# Patient Record
Sex: FEMALE | Race: WHITE
[De-identification: names, ages, dates, MRNs, and addresses within clinical notes are randomized per-mention and may not be internally consistent; named-entity substitution may affect disease eponyms.]

---

## 2019-03-11 ENCOUNTER — HOSPITAL ENCOUNTER (EMERGENCY)
Dept: HOSPITAL 41 - JD.ED | Age: 59
Discharge: HOME | End: 2019-03-11
Payer: COMMERCIAL
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
## 2019-03-11 DIAGNOSIS — S06.0X1A: Primary | ICD-10-CM

## 2019-03-11 DIAGNOSIS — Z88.2: ICD-10-CM

## 2019-03-11 DIAGNOSIS — S13.9XXA: ICD-10-CM

## 2019-03-11 DIAGNOSIS — Z88.0: ICD-10-CM
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
## 2019-03-11 NOTE — CT
Head CT

 

Technique: Multiple axial sections through the brain were obtained.  

Intravenous contrast was not utilized.

 

Comparison: No prior intracranial imaging is available.

 

Findings: Ventricles along with basal cisterns and sulci over 

convexities are within normal limits for the patient's age.  No 

abnormal parenchymal densities are seen.  No evidence of intracranial 

hemorrhage.  No midline shift or mass effect is seen.

 

Bone window settings were reviewed which shows no acute calvarial 

abnormality.  Visualized sinuses are clear.  Incidental 

atherosclerotic calcification is noted within the carotid siphon.

 

Impression:

1.  Atherosclerotic calcification within the carotid siphon.

2.  No acute intracranial abnormality is identified.

 

Diagnostic code #2

## 2019-03-11 NOTE — CT
CT cervical spine

 

Technique: Multiple axial sections through the cervical spine were 

obtained from above C1 inferiorly to the bottom of T2.  Reconstructed 

sagittal and coronal images were reviewed.

 

Comparison: No prior cervical spine imaging.

 

Findings:  Severe disc space narrowing is noted C4-5, C5-6 and C6-7.  

Posterior osteophytes are seen at these 3 levels as well as anterior 

osteophytes.  Mild anterior disc space narrowing is noted C2-3 and 

C3-4.  Minimal ligamentum nuchal calcification is seen.  

 

Moderate neural foraminal narrowing is noted at C4-5 on the right 

side.  Mild left-sided neural foraminal stenosis is noted at C5-6.  

Moderate right-sided neural foraminal stenosis is noted at C6-7.  

Other neural foramina are patent.  No bony central canal stenosis is 

seen.

 

No fracture is seen.  No abnormal subluxation is noted.

 

Mild scattered degenerative change is seen within the apophyseal 

joints.  Degenerative change is noted within the uncovertebral joints 

at C4-5, C5-6 and C6-7.

 

Impression:

1.  Degenerative change as noted above.

2.  Nothing acute is seen on CT study of the cervical spine.

 

Diagnostic code #2

## 2020-10-05 NOTE — EDM.PDOC
ED HPI GENERAL MEDICAL PROBLEM





- General


Chief Complaint: Neuro Symptoms/Deficits


Stated Complaint: [facility]


Time Seen by Provider: 10/05/20 08:21


Source of Information: Reports: Patient, EMS


History Limitations: Reports: No Limitations





- History of Present Illness


INITIAL COMMENTS - FREE TEXT/NARRATIVE: 





The patient presents by [facility] ambulance for a seizure.  The patient 

was at home and she went down to the frig to get her significant other something

to eat.  She felt nauseated when she walk to the frig and then she woke up on 

the floor and her significant other was holding her head and calling 911.  She 

had a headache before this for a few days.  She also had been coughing for a few

days.  She has not been around anyone who is sick as far as she knows.  [place].  Her significant other had knee 

surgery in [place] last week and that was the only time she had been out except

to the [employer] and grocery stores.  She has never had a seizure before. 

She has no ches pain, shortness of breath, abdominal pain, nausea or vomiting.  

She has no diarrhea.


Onset: Sudden


Duration: Minutes:


Location: Reports: Generalized


Severity: Moderate


Improves with: Reports: None


Worsens with: Reports: None


Associated Symptoms: Reports: Cough, Headaches, Seizure.  Denies: Chest Pain, 

Fever/Chills, Nausea/Vomiting, Shortness of Breath


  ** Headache


Pain Score (Numeric/FACES): 4





- Related Data


                                    Allergies











Allergy/AdvReac Type Severity Reaction Status Date / Time


 


Penicillins Allergy  Swelling Verified 10/05/20 08:24


 


Sulfa (Sulfonamide Allergy  Swelling Verified 10/05/20 08:24





Antibiotics)     











Home Meds: 


                                    Home Meds





Alendronate Sodium 1 cap PO MO 19 [History]


Progesterone, Micronized [Progesterone] 1 cap PO BEDTIME 19 [History]


estradioL [Estradiol] 1 tab VAG MOTU 19 [History]


Ascorbic Acid [Vitamin C] 1,000 mg PO DAILY 10/05/20 [History]


Calc/D3/Mag/Zn/Inessa/Joseph/Boron [Calcium 600 MG Plus Vit D] 1 cap PO DAILY 

10/05/20 [History]


Levothyroxine 0 mcg PO DAILY 10/05/20 [History]











Past Medical History





- Past Health History


Medical/Surgical History: Denies Medical/Surgical History


HEENT History: Reports: Impaired Vision, Sinusitis


Other HEENT History: reading eyeglasses.


Respiratory History: Reports: Other (See Below)


Other Respiratory History: seasonal allergies.


Genitourinary History: Reports: UTI, Recurrent


OB/GYN History: Reports: Pregnancy


Psychiatric History: Reports: None


Endocrine/Metabolic History: Reports: Hypothyroidism


Hematologic History: Reports: Anemia


Other Hematologic History: in childhood, pregnancy.





- Infectious Disease History


Infectious Disease History: Reports: Chicken Pox, Novel Coronavirus, Shingles





- Past Surgical History


Female  Surgical History: Reports:  Section





Social & Family History





- Tobacco Use


Smoking Status *Q: Never Smoker


Second Hand Smoke Exposure: No





- Caffeine Use


Caffeine Use: Reports: Coffee, Soda





- Recreational Drug Use


Recreational Drug Use: No





ED ROS GENERAL





- Review of Systems


Review Of Systems: See Below


Constitutional: Reports: No Symptoms


HEENT: Reports: No Symptoms


Respiratory: Reports: Cough.  Denies: Shortness of Breath


Cardiovascular: Reports: No Symptoms


Endocrine: Reports: No Symptoms


GI/Abdominal: Reports: No Symptoms


: Reports: No Symptoms


Musculoskeletal: Reports: No Symptoms


Skin: Reports: No Symptoms


Neurological: Reports: Headache, Seizure.  Denies: Dizziness, Numbness





- Physical Exam


Exam: See Below


Exam Limited By: No Limitations


General Appearance: Alert, No Apparent Distress


Ears: Normal External Exam


Nose: Normal Inspection


Head Exam: Atraumatic, Normocephalic


Neck: Normal Inspection


Respiratory/Chest: No Respiratory Distress, Lungs Clear, Normal Breath Sounds


Cardiovascular: Regular Rate, Rhythm, No Edema, No Murmur


GI/Abdominal: Soft, Non-Tender, No Organomegaly, No Mass


Neuro Exam (Abbreviated): Alert, Oriented, No Motor/Sensory Deficits


Extremities: Normal Inspection





EKG INTERPRETATION


EKG Date: 10/05/20


Time: 08:53


Rhythm: NSR


Rate (Beats/Min): 78


Axis: Normal


P-Wave: Present


QRS: Normal


ST-T: Normal


QT: Normal





Course





- Vital Signs


Last Recorded V/S: 


                                Last Vital Signs











Temp  97.6 F   10/05/20 08:13


 


Pulse  80   10/05/20 08:13


 


Resp  20   10/05/20 08:13


 


BP  132/79   10/05/20 08:13


 


Pulse Ox  99   10/05/20 08:13














- Orders/Labs/Meds


Orders: 


                               Active Orders 24 hr











 Category Date Time Status


 


 Cardiac Monitoring [RC] .AS DIRECTED Care  10/05/20 08:26 Active


 


 EKG Documentation Completion [RC] STAT Care  10/05/20 08:26 Active


 


 Peripheral IV Care [RC] .AS DIRECTED Care  10/05/20 08:27 Active


 


 Ang Chest [CT] Stat Exams  10/05/20 09:51 Taken


 


 Chest 1V Frontal [CR] Stat Exams  10/05/20 08:27 Taken


 


 Head wo Cont [CT] Stat Exams  10/05/20 08:27 Taken


 


 Sodium Chloride 0.9% [Normal Saline] 100 ml Med  10/05/20 10:45 Active





 IV ASDIRECTED   


 


 Sodium Chloride 0.9% [Saline Flush] Med  10/05/20 08:26 Active





 10 ml FLUSH ASDIRECTED PRN   


 


 Peripheral IV Insertion Adult [OM.PC] Stat Oth  10/05/20 08:26 Ordered








                                Medication Orders





Sodium Chloride (Normal Saline)  100 mls @ 60 mls/hr IV ASDIRECTED RADU


Sodium Chloride (Saline Flush)  10 ml FLUSH ASDIRECTED PRN


   PRN Reason: Keep Vein Open


   Last Admin: 10/05/20 09:00  Dose: 10 ml


   Documented by: STONE








Labs: 


                                Laboratory Tests











  10/05/20 10/05/20 10/05/20 Range/Units





  08:42 08:42 08:42 


 


WBC  3.90 L    (3.98-10.04)  K/mm3


 


RBC  4.91    (3.98-5.22)  M/mm3


 


Hgb  13.6    (11.2-15.7)  gm/dl


 


Hct  43.4    (34.1-44.9)  %


 


MCV  88.4    (79.4-94.8)  fl


 


MCH  27.7    (25.6-32.2)  pg


 


MCHC  31.3 L    (32.2-35.5)  g/dl


 


RDW Std Deviation  44.0    (36.4-46.3)  fL


 


Plt Count  203  D    (182-369)  K/mm3


 


MPV  10.0    (9.4-12.3)  fl


 


Neut % (Auto)  72.8 H    (34.0-71.1)  %


 


Lymph % (Auto)  16.9 L    (19.3-51.7)  %


 


Mono % (Auto)  9.7    (4.7-12.5)  %


 


Eos % (Auto)  0.3 L    (0.7-5.8)  


 


Baso % (Auto)  0.3    (0.1-1.2)  %


 


Neut # (Auto)  2.84    (1.56-6.13)  K/mm3


 


Lymph # (Auto)  0.66 L    (1.18-3.74)  K/mm3


 


Mono # (Auto)  0.38 H    (0.24-0.36)  K/mm3


 


Eos # (Auto)  0.01 L    (0.04-0.36)  K/mm3


 


Baso # (Auto)  0.01    (0.01-0.08)  K/mm3


 


D-Dimer, Quantitative   0.90 H   (0.19-0.50)  mg/L


 


Sodium    141  (136-145)  mEq/L


 


Potassium    4.5  (3.5-5.1)  mEq/L


 


Chloride    105  ()  mEq/L


 


Carbon Dioxide    28  (21-32)  mEq/L


 


Anion Gap    12.5  (5-15)  


 


BUN    14  (7-18)  mg/dL


 


Creatinine    0.8  (0.55-1.02)  mg/dL


 


Est Cr Clr Drug Dosing    57.84  mL/min


 


Estimated GFR (MDRD)    > 60  (>60)  mL/min


 


BUN/Creatinine Ratio    17.5  (14-18)  


 


Glucose    108 H  ()  mg/dL


 


Calcium    8.6  (8.5-10.1)  mg/dL


 


Magnesium    2.0  (1.8-2.4)  mg/dl


 


Total Bilirubin    0.4  (0.2-1.0)  mg/dL


 


AST    83 H  (15-37)  U/L


 


ALT    132 H  (14-59)  U/L


 


Alkaline Phosphatase    107  ()  U/L


 


Troponin I    < 0.017  (0.00-0.056)  ng/mL


 


C-Reactive Protein    1.5 H*  (<1.0)  mg/dL


 


Total Protein    7.6  (6.4-8.2)  g/dl


 


Albumin    3.6  (3.4-5.0)  g/dl


 


Globulin    4.0  gm/dL


 


Albumin/Globulin Ratio    0.9 L  (1-2)  


 


SARS-CoV-2 RNA (HYACINTH)     (NEGATIVE)  














  10/05/20 Range/Units





  08:50 


 


WBC   (3.98-10.04)  K/mm3


 


RBC   (3.98-5.22)  M/mm3


 


Hgb   (11.2-15.7)  gm/dl


 


Hct   (34.1-44.9)  %


 


MCV   (79.4-94.8)  fl


 


MCH   (25.6-32.2)  pg


 


MCHC   (32.2-35.5)  g/dl


 


RDW Std Deviation   (36.4-46.3)  fL


 


Plt Count   (182-369)  K/mm3


 


MPV   (9.4-12.3)  fl


 


Neut % (Auto)   (34.0-71.1)  %


 


Lymph % (Auto)   (19.3-51.7)  %


 


Mono % (Auto)   (4.7-12.5)  %


 


Eos % (Auto)   (0.7-5.8)  


 


Baso % (Auto)   (0.1-1.2)  %


 


Neut # (Auto)   (1.56-6.13)  K/mm3


 


Lymph # (Auto)   (1.18-3.74)  K/mm3


 


Mono # (Auto)   (0.24-0.36)  K/mm3


 


Eos # (Auto)   (0.04-0.36)  K/mm3


 


Baso # (Auto)   (0.01-0.08)  K/mm3


 


D-Dimer, Quantitative   (0.19-0.50)  mg/L


 


Sodium   (136-145)  mEq/L


 


Potassium   (3.5-5.1)  mEq/L


 


Chloride   ()  mEq/L


 


Carbon Dioxide   (21-32)  mEq/L


 


Anion Gap   (5-15)  


 


BUN   (7-18)  mg/dL


 


Creatinine   (0.55-1.02)  mg/dL


 


Est Cr Clr Drug Dosing   mL/min


 


Estimated GFR (MDRD)   (>60)  mL/min


 


BUN/Creatinine Ratio   (14-18)  


 


Glucose   ()  mg/dL


 


Calcium   (8.5-10.1)  mg/dL


 


Magnesium   (1.8-2.4)  mg/dl


 


Total Bilirubin   (0.2-1.0)  mg/dL


 


AST   (15-37)  U/L


 


ALT   (14-59)  U/L


 


Alkaline Phosphatase   ()  U/L


 


Troponin I   (0.00-0.056)  ng/mL


 


C-Reactive Protein   (<1.0)  mg/dL


 


Total Protein   (6.4-8.2)  g/dl


 


Albumin   (3.4-5.0)  g/dl


 


Globulin   gm/dL


 


Albumin/Globulin Ratio   (1-2)  


 


SARS-CoV-2 RNA (HYACINTH)  Positive H  (NEGATIVE)  











Meds: 


Medications











Generic Name Dose Route Start Last Admin





  Trade Name Rodríguez  PRN Reason Stop Dose Admin


 


Sodium Chloride  100 mls @ 60 mls/hr  10/05/20 10:45 





  Normal Saline  IV  





  ASDIRECTED RADU  


 


Sodium Chloride  10 ml  10/05/20 08:26  10/05/20 09:00





  Saline Flush  FLUSH   10 ml





  ASDIRECTED PRN   Administration





  Keep Vein Open  














Discontinued Medications














Generic Name Dose Route Start Last Admin





  Trade Name Freq  PRN Reason Stop Dose Admin


 


Iopamidol  100 ml  10/05/20 10:37 





  Isovue-370 (76%)  IVPUSH  10/05/20 10:38 





  ONETIME ONE  


 


Sodium Chloride  10 ml  10/05/20 10:37 





  Saline Flush  FLUSH  10/05/20 10:38 





  ONETIME ONE  














- Re-Assessments/Exams


Free Text/Narrative Re-Assessment/Exam: 





10/05/20 11:24


I ordered an IV saline lock, EKG, CXR, CT of her head, labs, COVID 19 positive. 

 Her EKG shows a NSR with no acute changes.  Her CXR looks good.  The CT of her 

head looks good.


10/05/20 11:26


Her WBC was a little low at 3.9.  Her D-dimer was elevated at 0.9.  I ordered a 

CT angio of her chest.  Her AST was elevated at 83.  Her ALT was elevated at 13

2.  Her troponin was negative.  Her CRP was elevated at 1.5.  Her COVID 19 was 

positive.





Her CT shows no pulmonary embolism present.  Patchy peripheral nodular airspace 

opacity.  Findings is nonspecific but concerning for possible pneumonia.  

Atypical/viral etiologies are possible.





Departure





- Departure


Time of Disposition: 11:40


Disposition: Home, Self-Care 01


Condition: Good


Clinical Impression: 


 COVID-19, Pneumonia due to COVID-19 virus, Seizure





Syncope


Qualifiers:


 Syncope type: unspecified Qualified Code(s): R55 - Syncope and collapse








- Discharge Information


*PRESCRIPTION DRUG MONITORING PROGRAM REVIEWED*: Not Applicable


*COPY OF PRESCRIPTION DRUG MONITORING REPORT IN PATIENT DIDI: Not Applicable


Referrals: 


Berenice Nicole MD [Primary Care Provider] - 1 Week


Forms:  ED Department Discharge


Additional Instructions: 


Drink plenty of fluids.  Take motrin or tylenol for any pain or fever.  Isolate 

yourself for a couple of weeks.  Please return if you are worse.





Sepsis Event Note (ED)





- Evaluation


Sepsis Screening Result: No Definite Risk





- Focused Exam


Vital Signs: 


                                   Vital Signs











  Temp Pulse Resp BP Pulse Ox


 


 10/05/20 08:13  97.6 F  80  20  132/79  99














- My Orders


Last 24 Hours: 


My Active Orders





10/05/20 08:26


Cardiac Monitoring [RC] .AS DIRECTED 


EKG Documentation Completion [RC] STAT 


Sodium Chloride 0.9% [Saline Flush]   10 ml FLUSH ASDIRECTED PRN 


Peripheral IV Insertion Adult [OM.PC] Stat 





10/05/20 08:27


Peripheral IV Care [RC] .AS DIRECTED 


Chest 1V Frontal [CR] Stat 


Head wo Cont [CT] Stat 





10/05/20 09:51


Ang Chest [CT] Stat 





10/05/20 10:45


Sodium Chloride 0.9% [Normal Saline] 100 ml IV ASDIRECTED 














- Assessment/Plan


Last 24 Hours: 


My Active Orders





10/05/20 08:26


Cardiac Monitoring [RC] .AS DIRECTED 


EKG Documentation Completion [RC] STAT 


Sodium Chloride 0.9% [Saline Flush]   10 ml FLUSH ASDIRECTED PRN 


Peripheral IV Insertion Adult [OM.PC] Stat 





10/05/20 08:27


Peripheral IV Care [RC] .AS DIRECTED 


Chest 1V Frontal [CR] Stat 


Head wo Cont [CT] Stat 





10/05/20 09:51


Ang Chest [CT] Stat 





10/05/20 10:45


Sodium Chloride 0.9% [Normal Saline] 100 ml IV ASDIRECTED

## 2020-11-06 NOTE — CT
PROCEDURE INFORMATION:

Exam: CT Head Without Contrast

Exam date and time: 10/5/2020 8:26 AM

Age: 60 years old

Clinical indication: Other: Seizure headaches; Patient HX: Seizure and headache 
no history of

seizure; Additional info: Prior report in images

TECHNIQUE:

Imaging protocol: Computed tomography of the head without contrast.

Radiation optimization: All CT scans at this facility use at least one of these 
dose optimization

techniques: automated exposure control; mA and/or kV adjustment per patient size
(includes targeted

exams where dose is matched to clinical indication); or iterative 
reconstruction.

COMPARISON:

CT Head wo Cont 3/11/2019 2:55 PM

FINDINGS:

Brain: Normal. No hemorrhage. Unremarkable white matter. No mass effect.

Cerebral ventricles: No ventriculomegaly.

Bones/joints: Unremarkable. No acute fracture.

Paranasal sinuses: Visualized sinuses are unremarkable. No fluid levels.

Mastoid air cells: Visualized mastoid air cells are well aerated.

Soft tissues: Unremarkable.

IMPRESSION:

No acute intracranial abnormality.

Thank you for allowing us to participate in the care of your patient.

Dictated and Authenticated by: Al Membreno MD

11/06/2020 11:00 AM Central Time ([place] & [place])

[facility]

## 2020-11-06 NOTE — CT
PROCEDURE INFORMATION:

Exam: CT Chest With Contrast

Exam date and time: 10/5/2020 10:00 AM

Age: 60 years old

Clinical indication: Abnormal findings; Abnormal diagnostic tests; Elevated d-
dimer; Additional info:

Prior report in images

TECHNIQUE:

Imaging protocol: Computed tomography of the chest with intravenous contrast.

Radiation optimization: All CT scans at this facility use at least one of these 
dose optimization

techniques: automated exposure control; mA and/or kV adjustment per patient size
(includes targeted

exams where dose is matched to clinical indication); or iterative 
reconstruction.

COMPARISON:

CR Chest 1V Frontal 10/5/2020 8:20 AM

FINDINGS:

Lungs: There is patchy peripheral airspace opacity. The findings are 
nonspecific. This could be due

to pneumonia including atypical/viral etiologies.

Pleural space: Unremarkable. No pneumothorax. No pleural effusion.

Heart: Unremarkable. No cardiomegaly. No pericardial effusion.

Aorta: Unremarkable. No aortic aneurysm.

Lymph nodes: Unremarkable. No enlarged lymph nodes.

Bones/joints: Unremarkable. No acute fracture.

Soft tissues: Unremarkable.

IMPRESSION:

1. No pulmonary embolism present.

2. Patchy peripheral nodular airspace opacity. Finding is nonspecific but 
concerning for possible

pneumonia. Atypical/viral etiologies are possible.

Thank you for allowing us to participate in the care of your patient.

DARRIUS CALABRESE Accession: [id number] MRN:[id number] | Final Radiology Report

CONFIDENTIALITY STATEMENT

This report is intended only for use by the referring physician, and only in 
accordance with law. If you received this in error, call [phone].

Page 2 of 2

Dictated and Authenticated by: Al Membreno MD

11/06/2020 12:31 PM Central Time ([place] & [place])

[facility]

## 2020-11-06 NOTE — CR
PROCEDURE INFORMATION:

Exam: XR Chest, 1 View

Exam date and time: 10/5/2020 8:20 AM

Age: 60 years old

Clinical indication: Chest pain

TECHNIQUE:

Imaging protocol: XR of the chest

Views: 1 view.

COMPARISON:

No relevant prior studies available.

FINDINGS:

Lungs: Unremarkable. No consolidation.

Pleural space: Unremarkable. No pleural effusion. No pneumothorax.

Heart/Mediastinum: Unremarkable. No cardiomegaly.

Bones/joints: Unremarkable.

IMPRESSION:

No acute findings.

Thank you for allowing us to participate in the care of your patient.

Dictated and Authenticated by: Al Membreno MD

11/06/2020 12:31 PM Central Time ([place] & [place])

[facility]